# Patient Record
Sex: FEMALE | Race: BLACK OR AFRICAN AMERICAN | NOT HISPANIC OR LATINO | URBAN - METROPOLITAN AREA
[De-identification: names, ages, dates, MRNs, and addresses within clinical notes are randomized per-mention and may not be internally consistent; named-entity substitution may affect disease eponyms.]

---

## 2024-09-05 ENCOUNTER — HOSPITAL ENCOUNTER (EMERGENCY)
Facility: HOSPITAL | Age: 39
Discharge: HOME | End: 2024-09-05
Payer: COMMERCIAL

## 2024-09-05 VITALS
OXYGEN SATURATION: 99 % | WEIGHT: 150 LBS | RESPIRATION RATE: 17 BRPM | DIASTOLIC BLOOD PRESSURE: 91 MMHG | SYSTOLIC BLOOD PRESSURE: 126 MMHG | HEART RATE: 97 BPM | TEMPERATURE: 98.1 F | HEIGHT: 67 IN | BODY MASS INDEX: 23.54 KG/M2

## 2024-09-05 DIAGNOSIS — M25.561 ACUTE PAIN OF RIGHT KNEE: Primary | ICD-10-CM

## 2024-09-05 PROCEDURE — 99281 EMR DPT VST MAYX REQ PHY/QHP: CPT

## 2024-09-05 PROCEDURE — 99283 EMERGENCY DEPT VISIT LOW MDM: CPT | Performed by: PHYSICIAN ASSISTANT

## 2024-09-05 ASSESSMENT — PAIN DESCRIPTION - ORIENTATION
ORIENTATION: RIGHT
ORIENTATION: RIGHT

## 2024-09-05 ASSESSMENT — PAIN DESCRIPTION - LOCATION
LOCATION: LEG
LOCATION: LEG

## 2024-09-05 ASSESSMENT — COLUMBIA-SUICIDE SEVERITY RATING SCALE - C-SSRS
6. HAVE YOU EVER DONE ANYTHING, STARTED TO DO ANYTHING, OR PREPARED TO DO ANYTHING TO END YOUR LIFE?: NO
1. IN THE PAST MONTH, HAVE YOU WISHED YOU WERE DEAD OR WISHED YOU COULD GO TO SLEEP AND NOT WAKE UP?: NO
2. HAVE YOU ACTUALLY HAD ANY THOUGHTS OF KILLING YOURSELF?: NO

## 2024-09-05 ASSESSMENT — PAIN SCALES - GENERAL
PAINLEVEL_OUTOF10: 7
PAINLEVEL_OUTOF10: 7

## 2024-09-05 ASSESSMENT — PAIN DESCRIPTION - PAIN TYPE
TYPE: ACUTE PAIN
TYPE: ACUTE PAIN

## 2024-09-05 ASSESSMENT — PAIN - FUNCTIONAL ASSESSMENT
PAIN_FUNCTIONAL_ASSESSMENT: 0-10
PAIN_FUNCTIONAL_ASSESSMENT: 0-10

## 2024-09-05 NOTE — Clinical Note
Nimisha Sheldon was seen and treated in our emergency department on 9/5/2024.  She may return to work on 09/07/2024.       If you have any questions or concerns, please don't hesitate to call.      Мария Abernathy PA-C

## 2024-09-06 NOTE — ED PROVIDER NOTES
"This is a 39-year-old female with past medical history of asthma, anxiety, and hypertension who presents to the ED with right knee pain for the past 1 month.  She states that she was seen previously at [facility] for this and had an x-ray which was negative.  She was referred to orthopedics and saw them as an outpatient and was referred to physical therapy.  She has had 1 physical therapy session.  She went back to work today and was having pain while at work so she put on her knee brace, however her job told her that she was not allowed to wear that knee brace with her current position.  She states that she had to leave work early secondary to the pain.  She is concerned that she has not had any further imaging of her knee which is why she presented to the ED today.  She is currently endorsing pain to the superior anterior/medial aspect of her knee.  She endorses a clicking popping sensation with moving the knee.  She is able to ambulate with some pain.  She denies any posterior calf pain or swelling.  She denies any history of DVT/PE.  She states otherwise she has been in her normal state of health.  She denies any associated fevers or chills.      History provided by:  Patient   used: No             Visit Vitals  BP (!) 126/91 (BP Location: Left arm, Patient Position: Sitting)   Pulse 97   Temp 36.7 °C (98.1 °F) (Temporal)   Resp 17   Ht 1.702 m (5' 7\")   Wt 68 kg (150 lb)   SpO2 99%   BMI 23.49 kg/m²   BSA 1.79 m²          Physical Exam     Physical exam:   Gen.: Vitals noted. No distress. Afebrile.   Neck: Supple. full range of motion. No midline or paraspinal tenderness to palpation.   EENT: PERRL, EOMI. Mucous membranes moist. Hearing grossly intact.   Cardiac: Regular rate rhythm. No murmur.   Pulmonary: Equal breath sounds bilaterally. No adventitious breath sounds.   Lower extremity: Exam of the left lower extremity is grossly unremarkable.  Exam of the right lower extremity is as " follows: There is a small palpable suprapatellar joint effusion.  There is tenderness palpation to the anterior aspect of the knee diffusely, worse along the medial/anterior aspect of the knee.  There is tenderness over the medial joint line. There is no tenderness over the lateral joint line. The extensor mechanism is intact. There is no obvious laxity. +grind.  No excess warmth or erythema to the joint.  The remainder of the extremity, specifically, the tib-fib, ankle, and foot are nontender. Skin is intact. Is neurovascularly intact distally. There is no evidence of an intra-articular infection. Compartments are soft to palpation. There is no suggestion of DVT.          Labs Reviewed - No data to display    No orders to display           ED Course & MDM     Medical Decision Making  This is a 39-year-old female who presents to the ED with continued right knee pain after the past 1 month.  Vital stable upon arrival to the ED.  On physical examination she had no posterior calf tenderness that would be suggestive of a DVT.  She was neurovascular intact distally area of pain.  Full range of motion of the right knee.  No excess warmth or erythema.  She denied any systemic signs and symptoms of infection so I low suspicion for septic arthritis.  She did have tenderness palpation to the anterior aspect of the knee as well as a small suprapatellar joint effusion and tenderness palpation over the medial joint line.  No ligamental laxity.  Patient's records from [facility] were reviewed and did show she had a recent x-ray for the same pain that was unremarkable.  As patient had no specific injury or trauma that caused this pain and the pain has been gradual onset and is worse with movement/bending I have low suspicion for acute fracture/dislocation.  She has also recently had a negative x-ray for the same symptoms so I did not repeat her x-ray today.  I discussed with the patient that I do agree with her current plan  of care and do recommend she continue following up with her current orthopedic provider as well as continue her physical therapy as sometimes it can take multiple physical therapy sessions before seeing any improvement.  She was written a note for her job for today and tomorrow.  She was advised to continue the meloxicam she is already taking as well as take Tylenol with this as needed.  Patient had inquired about an MRI and I discussed with the patient that  as this was not a threat to life or limb emergent MRI from the ER was not indicated at this point in time and that this could be arranged as an outpatient through her orthopedic provider if she is not having improvement with physical therapy.  She was agreeable to this plan.  She was provided with orthopedic follow-up information for our clinic if she does not want to follow-up with her current orthopedic provider and was discharged from the ED in stable condition.  She was agreeable to this plan and had no further questions at this time.    Amount and/or Complexity of Data Reviewed  External Data Reviewed: notes.         Diagnoses as of 09/05/24 2154   Acute pain of right knee       Patient was seen independently    Procedures    STEPHANIE Angel, PAChuyitaC     Мария Abernathy PA-C  09/05/24 [id number]

## 2024-09-06 NOTE — ED TRIAGE NOTES
Pt to ED with c/o R leg. Pt has been seen for it at [facility]. Sent to PT. Works for [employer] and then told her she could not work like that. 7/10 pain in R leg. Taking tylenol at home. Pt steady gait in triage.